# Patient Record
Sex: FEMALE
[De-identification: names, ages, dates, MRNs, and addresses within clinical notes are randomized per-mention and may not be internally consistent; named-entity substitution may affect disease eponyms.]

---
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
## 2020-12-21 PROBLEM — Z87.09 HISTORY OF SORE THROAT: Status: RESOLVED | Noted: 2019-01-18 | Resolved: 2020-12-21

## 2020-12-21 PROBLEM — Z86.19 HISTORY OF VIRAL PHARYNGITIS: Status: RESOLVED | Noted: 2019-03-04 | Resolved: 2020-12-21
